# Patient Record
Sex: MALE | Race: BLACK OR AFRICAN AMERICAN | URBAN - METROPOLITAN AREA
[De-identification: names, ages, dates, MRNs, and addresses within clinical notes are randomized per-mention and may not be internally consistent; named-entity substitution may affect disease eponyms.]

---

## 2018-08-03 ENCOUNTER — OFFICE VISIT (OUTPATIENT)
Dept: FAMILY MEDICINE CLINIC | Age: 28
End: 2018-08-03

## 2018-08-03 VITALS
WEIGHT: 201.8 LBS | SYSTOLIC BLOOD PRESSURE: 118 MMHG | HEIGHT: 71 IN | DIASTOLIC BLOOD PRESSURE: 65 MMHG | BODY MASS INDEX: 28.25 KG/M2 | TEMPERATURE: 97.6 F | OXYGEN SATURATION: 99 % | RESPIRATION RATE: 16 BRPM | HEART RATE: 53 BPM

## 2018-08-03 DIAGNOSIS — Z86.39 HISTORY OF HYPERTHYROIDISM: ICD-10-CM

## 2018-08-03 DIAGNOSIS — Z00.00 WELL ADULT EXAM: Primary | ICD-10-CM

## 2018-08-03 LAB
BILIRUB UR QL STRIP: NEGATIVE
GLUCOSE UR-MCNC: NEGATIVE MG/DL
KETONES P FAST UR STRIP-MCNC: NEGATIVE MG/DL
PH UR STRIP: 7 [PH] (ref 4.6–8)
PROT UR QL STRIP: NEGATIVE
SP GR UR STRIP: 1.02 (ref 1–1.03)
UA UROBILINOGEN AMB POC: NORMAL (ref 0.2–1)
URINALYSIS CLARITY POC: CLEAR
URINALYSIS COLOR POC: YELLOW
URINE BLOOD POC: NEGATIVE
URINE LEUKOCYTES POC: NEGATIVE
URINE NITRITES POC: NEGATIVE

## 2018-08-03 NOTE — PROGRESS NOTES
Chief Complaint Patient presents with Verlinda Smoker Establish Care New patient Physical  
Room 4

## 2018-08-03 NOTE — MR AVS SNAPSHOT
[facility] [address] [facility] 
[phone] Patient: Tamika Camacho MRN: [id number] :1990 Visit Information Date & Time Provider Department Dept. Phone Encounter #  
 8/3/2018 12:00 PM Mary Barillas MD [facility] at [address] [id number] Follow-up Instructions Return if symptoms worsen or fail to improve. Your Appointments 8/3/2018 12:00 PM  
New Patient with Mary Barillas MD  
[facility] at [facility]) [address] [address]  
[facility] Upcoming Health Maintenance Date Due DTaP/Tdap/Td series (1 - Tdap) 2012 Influenza Age 5 to Adult 2019* *Topic was postponed. The date shown is not the original due date. Allergies as of 8/3/2018  Review Complete On: 8/3/2018 By: Mary Barillas MD  
 No Known Allergies Current Immunizations  Never Reviewed No immunizations on file. Not reviewed this visit You Were Diagnosed With   
  
 Codes Comments Well adult exam    -  Primary ICD-10-CM: Z00.00 ICD-9-CM: V70.0 History of hyperthyroidism     ICD-10-CM: Z86.39 
ICD-9-CM: V12.29 Vitals BP Pulse Temp Resp Height(growth percentile) Weight(growth percentile)  
 118/65 (BP 1 Location: Right arm, BP Patient Position: Sitting) (!) 53 97.6 °F (36.4 °C) (Oral) 16 5' 10.75\" (1.797 m) 201 lb 12.8 oz (91.5 kg) SpO2 BMI Smoking Status 99% 28.34 kg/m2 Never Smoker Vitals History BMI and BSA Data Body Mass Index Body Surface Area  
 28.34 kg/m 2 2.14 m 2 Your Updated Medication List  
  
Notice  As of 8/3/2018 11:49 AM  
 You have not been prescribed any medications. We Performed the Following AMB POC URINALYSIS DIP STICK AUTO W/O MICRO [18854 CPT(R)] CBC W/O DIFF [68851 CPT(R)] HEMOGLOBIN A1C WITH EAG [64955 CPT(R)] LIPID PANEL [11108 CPT(R)] METABOLIC PANEL, COMPREHENSIVE [94016 CPT(R)] T4, FREE [id number] CPT(R)] TSH 3RD GENERATION [24721 CPT(R)] Follow-up Instructions Return if symptoms worsen or fail to improve. Introducing [facility] & HEALTH SERVICES! [employer] introduces [employer] patient portal. Now you can access parts of your medical record, email your doctor's office, and request medication refills online. 1. In your internet browser, go to [email or web address] 2. Click on the First Time User? Click Here link in the Sign In box. You will see the New Member Sign Up page. 3. Enter your [employer] Access Code exactly as it appears below. You will not need to use this code after youve completed the sign-up process. If you do not sign up before the expiration date, you must request a new code. · [employer] Access Code: [email or web address] Expires: 11/1/2018 11:22 AM 
 
4. Enter the last four digits of your Social Security Number (xxxx) and Date of Birth (mm/dd/yyyy) as indicated and click Submit. You will be taken to the next sign-up page. 5. Create a Dejamor ID. This will be your Dejamor login ID and cannot be changed, so think of one that is secure and easy to remember. 6. Create a De[employer]or password. You can change your password at any time. 7. Enter your Password Reset Question and Answer. This can be used at a later time if you forget your password. 8. Enter your e-mail address. You will receive e-mail notification when new information is available in [address] 9. Click Sign Up. You can now view and download portions of your medical record. 10. Click the Download Summary menu link to download a portable copy of your medical information. If you have questions, please visit the Frequently Asked Questions section of the [employer] website.  Remember, De[employer]or is NOT to be used for urgent needs. For medical emergencies, dial 911. Now available from your iPhone and Android! Please provide this summary of care documentation to your next provider. Your primary care clinician is listed as Laurie Constantino. If you have any questions after today's visit, please call [phone].

## 2018-08-03 NOTE — PROGRESS NOTES
Subjective: Chief Complaint Patient presents with [address] [place] [address] New patient He  is a 33 y.o. male who presents for evaluation of: CPE Pt here for concerns with fatigue and much more prominent diaphoresis while playing basketball recently. Has hx of Hyperthyroidism and was treated with MMZ for about 3 yrs at [address] [facility]. Weaned off and was euthyroid. No recent labs checked for the past few years and would like labs checked today. [place] like he could not keep up at all which is much different than his baseline. Has been exercising about weekly with basketball or treadmill prior to this. Denies tachycardia, heat intolerance, irritability, jitteriness, restlessness, poor sleeping, poor eating, weight loss, palpitations. May have fine tremor. 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 Fhx of hypothyroidism in mom ROS: 
Constitutional: negative except for fevers, chills Eyes: negative for visual disturbance Ears, nose, mouth, throat, and face: negative for hearing loss and sore throat Respiratory: negative for cough or dyspnea on exertion Cardiovascular: negative for chest pain, dyspnea, palpitations, fatigue Gastrointestinal: negative for nausea, vomiting, change in bowel habits, diarrhea and abdominal pain Genitourinary:negative for frequency and dysuria Integument/breast: negative for rash and skin lesion(s) Hematologic/lymphatic: negative for easy bruising and bleeding Musculoskeletal:negative for myalgias and muscle weakness Neurological: negative for headaches and dizziness Behavioral/Psych: negative for anxiety and depression Past Medical History:  
Diagnosis Date  Thyroid disease Past Surgical History:  
Procedure Laterality Date  HX ORTHOPAEDIC Fractured Jaw No current outpatient prescriptions on file prior to visit.   
 
No current facility-administered medications on file prior to visit. Objective:  
 
Vitals:  
 08/03/18 1117 BP: 118/65 Pulse: (!) 53 Resp: 16 Temp: 97.6 °F (36.4 °C) TempSrc: Oral  
SpO2: 99% Weight: 201 lb 12.8 oz (91.5 kg) Height: 5' 10.75\" (1.797 m) Physical Examination: 
General appearance - alert, well appearing, and in no distress Eyes - sclera anicteric Ears - nml TMs bilat Nose - normal and patent, no erythema, discharge or polyps Mouth - mucous membranes moist, pharynx normal without lesions Neck - supple, no significant adenopathy Lymphatics - no palpable lymphadenopathy, no hepatosplenomegaly Chest - clear to auscultation, no wheezes, rales or rhonchi, symmetric air entry Heart - normal rate, regular rhythm, normal S1, S2, no murmurs, rubs, clicks or gallops Abdomen - soft, nontender, nondistended, no masses or organomegaly  - not indicated Neurological - alert, oriented, normal speech, no focal findings or movement disorder noted Musculoskeletal - no joint tenderness, deformity or swelling Extremities - no edema Skin - normal coloration and turgor, no rashes, no suspicious skin lesions noted Psych - nml mood and affect Assessment/ Plan:  
Diagnoses and all orders for this visit: 
 
1. Well adult exam - no major concerns, hx of hyperthyroidism as below 
-     CBC W/O DIFF 
-     METABOLIC PANEL, COMPREHENSIVE 
-     LIPID PANEL 
-     HEMOGLOBIN A1C WITH EAG 
-     TSH 3RD GENERATION 
-     AMB POC URINALYSIS DIP STICK AUTO W/O MICRO 
-     T4, FREE 2. History of hyperthyroidism - rechecking labs, suspect fatigue with exertion was more likely related to coming off night shift 
-     TSH 3RD GENERATION 
-     AMB POC URINALYSIS DIP STICK AUTO W/O MICRO 
-     T4, FREE I have discussed the diagnosis with the patient and the intended plan as seen in the above orders.   The patient has received an after-visit summary and questions were answered concerning future plans.  I have discussed medication side effects and warnings with the patient as well. The patient verbalizes understanding and agreement with the plan. Follow-up Disposition: 
Return in about 1 year (around 8/3/2019), or if symptoms worsen or fail to improve.

## 2018-08-11 LAB
ALBUMIN SERPL-MCNC: 4.3 G/DL (ref 3.5–5.5)
ALBUMIN/GLOB SERPL: 1.5 {RATIO} (ref 1.2–2.2)
ALP SERPL-CCNC: 66 IU/L (ref 39–117)
ALT SERPL-CCNC: 34 IU/L (ref 0–44)
AST SERPL-CCNC: 28 IU/L (ref 0–40)
BILIRUB SERPL-MCNC: 0.5 MG/DL (ref 0–1.2)
BUN SERPL-MCNC: 11 MG/DL (ref 6–20)
BUN/CREAT SERPL: 9 (ref 9–20)
CALCIUM SERPL-MCNC: 9.1 MG/DL (ref 8.7–10.2)
CHLORIDE SERPL-SCNC: 101 MMOL/L (ref 96–106)
CHOLEST SERPL-MCNC: 126 MG/DL (ref 100–199)
CO2 SERPL-SCNC: 24 MMOL/L (ref 20–29)
CREAT SERPL-MCNC: 1.18 MG/DL (ref 0.76–1.27)
ERYTHROCYTE [DISTWIDTH] IN BLOOD BY AUTOMATED COUNT: 14.5 % (ref 12.3–15.4)
EST. AVERAGE GLUCOSE BLD GHB EST-MCNC: 117 MG/DL
GLOBULIN SER CALC-MCNC: 2.9 G/DL (ref 1.5–4.5)
GLUCOSE SERPL-MCNC: 92 MG/DL (ref 65–99)
HBA1C MFR BLD: 5.7 % (ref 4.8–5.6)
HCT VFR BLD AUTO: 45.4 % (ref 37.5–51)
HDLC SERPL-MCNC: 43 MG/DL
HGB BLD-MCNC: 14.8 G/DL (ref 13–17.7)
LDLC SERPL CALC-MCNC: 72 MG/DL (ref 0–99)
MCH RBC QN AUTO: 26.8 PG (ref 26.6–33)
MCHC RBC AUTO-ENTMCNC: 32.6 G/DL (ref 31.5–35.7)
MCV RBC AUTO: 82 FL (ref 79–97)
PLATELET # BLD AUTO: 243 X10E3/UL (ref 150–379)
POTASSIUM SERPL-SCNC: 4.2 MMOL/L (ref 3.5–5.2)
PROT SERPL-MCNC: 7.2 G/DL (ref 6–8.5)
RBC # BLD AUTO: 5.53 X10E6/UL (ref 4.14–5.8)
SODIUM SERPL-SCNC: 141 MMOL/L (ref 134–144)
T4 FREE SERPL-MCNC: 1.42 NG/DL (ref 0.82–1.77)
TRIGL SERPL-MCNC: 56 MG/DL (ref 0–149)
TSH SERPL DL<=0.005 MIU/L-ACNC: 0.79 UIU/ML (ref 0.45–4.5)
VLDLC SERPL CALC-MCNC: 11 MG/DL (ref 5–40)
WBC # BLD AUTO: 4.5 X10E3/UL (ref 3.4–10.8)

## 2019-11-21 ENCOUNTER — OFFICE VISIT (OUTPATIENT)
Dept: FAMILY MEDICINE CLINIC | Age: 29
End: 2019-11-21

## 2019-11-21 VITALS
DIASTOLIC BLOOD PRESSURE: 67 MMHG | TEMPERATURE: 98.5 F | RESPIRATION RATE: 16 BRPM | WEIGHT: 230 LBS | HEART RATE: 59 BPM | SYSTOLIC BLOOD PRESSURE: 138 MMHG | BODY MASS INDEX: 32.2 KG/M2 | HEIGHT: 71 IN | OXYGEN SATURATION: 97 %

## 2019-11-21 DIAGNOSIS — B35.3 TINEA PEDIS OF RIGHT FOOT: ICD-10-CM

## 2019-11-21 DIAGNOSIS — M25.571 CHRONIC PAIN OF RIGHT ANKLE: ICD-10-CM

## 2019-11-21 DIAGNOSIS — G89.29 CHRONIC PAIN OF RIGHT ANKLE: ICD-10-CM

## 2019-11-21 DIAGNOSIS — L98.9 SKIN LESION OF FOOT: Primary | ICD-10-CM

## 2019-11-21 RX ORDER — CHLORPHENIRAMINE MALEATE 4 MG
TABLET ORAL 2 TIMES DAILY
Qty: 15 G | Refills: 0 | Status: SHIPPED | OUTPATIENT
Start: 2019-11-21

## 2019-11-21 RX ORDER — MELOXICAM 15 MG/1
15 TABLET ORAL
Qty: 30 TAB | Refills: 0 | Status: SHIPPED | OUTPATIENT
Start: 2019-11-21 | End: 2019-12-11 | Stop reason: SDUPTHER

## 2019-11-21 RX ORDER — SERTRALINE HYDROCHLORIDE 100 MG/1
100 TABLET, FILM COATED ORAL
COMMUNITY
Start: 2019-04-03

## 2019-11-21 NOTE — PROGRESS NOTES
Subjective:     Chief Complaint   Patient presents with    Foot Pain     Right        He  is a 30 y.o. male who presents for evaluation of:  Foot pain- R foot in between nails with hypopigmented and macerated areas x ~ 10 yrs. Specifically has an area betwee 3rd and 4th digits that is hard and painful marcelle when running. Tried topical antifungals many yrs ago. Mom has similar issue. Gen uses lotion on feet. Pain has been worse when running. Prev was dealing with right ankle especially with running. He was seen by Ortho back in 5/2019 and diagnosed with a sprain of the deltoid ligament and Achilles tendinitis. He was placed in an ASO and started on Aleve. He was sent to physical therapy. This has since improved. ROS  Gen - no fever/chills  Resp - no dyspnea or cough  CV - no chest pain or SHEPHERD  Rest per HPI    Past Medical History:   Diagnosis Date    Thyroid disease      Past Surgical History:   Procedure Laterality Date    HX ORTHOPAEDIC      Fractured Jaw     Current Outpatient Medications on File Prior to Visit   Medication Sig Dispense Refill    sertraline (ZOLOFT) 100 mg tablet Take 100 mg by mouth. No current facility-administered medications on file prior to visit. Objective:     Vitals:    11/21/19 1329   BP: 138/67   Pulse: (!) 59   Resp: 16   Temp: 98.5 °F (36.9 °C)   TempSrc: Oral   SpO2: 97%   Weight: 230 lb (104.3 kg)   Height: 5' 10.75\" (1.797 m)     Physical Examination:  General appearance - alert, well appearing, and in no distress  Eyes -sclera anicteric  Chest - clear to auscultation, no wheezes, rales or rhonchi, symmetric air entry  Heart - normal rate, regular rhythm, normal S1, S2, no murmurs, rubs, clicks or gallops  Skin -     Assessment/ Plan:   Diagnoses and all orders for this visit:    1.  Skin lesion of foot - performed bx with scrapings and shaved down top layer for comfort, most c/w callous related to tinea with chronic maceration and pressure related to running. Will tx as below. May need to see podiatry if not improving.  -     meloxicam (MOBIC) 15 mg tablet; Take 1 Tab by mouth daily (after breakfast). Take x 1 week and then stop. May use daily after this as needed. -     SURGICAL PATHOLOGY    2. Tinea pedis of right foot - still suspect Tinea based on exam.  Hx of long duration seems less convincing but will try Clotrim  -     clotrimazole (LOTRIMIN) 1 % topical cream; Apply  to affected area two (2) times a day. 3. Chronic pain of right ankle - doing well with this  -     meloxicam (MOBIC) 15 mg tablet; Take 1 Tab by mouth daily (after breakfast). Take x 1 week and then stop. May use daily after this as needed. I spent > 50% of the 25 min visit counseling and educating about: skin lesion and tinea     I have discussed the diagnosis with the patient and the intended plan as seen in the above orders. The patient has received an after-visit summary and questions were answered concerning future plans. I have discussed medication side effects and warnings with the patient as well. The patient verbalizes understanding and agreement with the plan. Follow-up and Dispositions    · Return if symptoms worsen or fail to improve.

## 2019-11-27 LAB
DX ICD CODE: NORMAL
DX ICD CODE: NORMAL
PATH REPORT.FINAL DX SPEC: NORMAL
PATH REPORT.GROSS SPEC: NORMAL
PATH REPORT.RELEVANT HX SPEC: NORMAL
PATH REPORT.SITE OF ORIGIN SPEC: NORMAL
PATHOLOGIST NAME: NORMAL
PAYMENT PROCEDURE: NORMAL

## 2019-12-11 DIAGNOSIS — G89.29 CHRONIC PAIN OF RIGHT ANKLE: ICD-10-CM

## 2019-12-11 DIAGNOSIS — M25.571 CHRONIC PAIN OF RIGHT ANKLE: ICD-10-CM

## 2019-12-11 DIAGNOSIS — L98.9 SKIN LESION OF FOOT: ICD-10-CM

## 2019-12-11 RX ORDER — MELOXICAM 15 MG/1
TABLET ORAL
Qty: 30 TAB | Refills: 0 | Status: SHIPPED | OUTPATIENT
Start: 2019-12-11

## 2020-02-28 DIAGNOSIS — J02.9 SORE THROAT: ICD-10-CM

## 2020-02-28 DIAGNOSIS — R05.9 COUGH: Primary | ICD-10-CM

## 2020-02-28 RX ORDER — GUAIFENESIN 600 MG/1
600 TABLET, EXTENDED RELEASE ORAL
Qty: 40 TAB | Refills: 1 | Status: SHIPPED | OUTPATIENT
Start: 2020-02-28

## 2021-02-10 PROBLEM — Z86.39 HISTORY OF HYPERTHYROIDISM: Status: ACTIVE | Noted: 2021-02-10

## 2021-02-15 ENCOUNTER — VIRTUAL VISIT (OUTPATIENT)
Dept: FAMILY MEDICINE CLINIC | Age: 31
End: 2021-02-15

## 2021-02-15 DIAGNOSIS — R73.03 PREDIABETES: ICD-10-CM

## 2021-02-15 DIAGNOSIS — Z13.220 SCREENING CHOLESTEROL LEVEL: ICD-10-CM

## 2021-02-15 DIAGNOSIS — E05.90 HYPERTHYROIDISM: Primary | ICD-10-CM

## 2021-02-15 PROCEDURE — 99213 OFFICE O/P EST LOW 20 MIN: CPT | Performed by: STUDENT IN AN ORGANIZED HEALTH CARE EDUCATION/TRAINING PROGRAM

## 2021-02-15 NOTE — PROGRESS NOTES
Kristina Jay  35 y.o. male  1990  [address]  [address]  [id number]   [facility]:    Telemedicine Progress Note  Andrew Duron MD       Encounter Date and Time: February 15, 2021 at 3:51 PM    Consent:  He and/or the health care decision maker is aware that that he may receive a bill for this [facility] service, depending on his insurance coverage, and has provided verbal consent to proceed: Yes    Chief Complaint   Patient presents with    Hyperthyroidism     History of Present Illness   Kristina Jay is a 35 y.o. male was evaluated by synchronous (real-time) audio-video technology from home, through the [employer]  Patient Portal.    Pt would like to be evaluated for the following;    Hyperthyroidism:    Has hx of Hyperthyroidism diagnosed more than 5 years ago. He was treated with Methimazole for about 3 yrs at [address] [place] [address]. Weaned off and was euthyroid. No recent labs checked for the past few years and would like labs checked today. [place] like he has been having some heat intolerance at work lately which he thinks could be related to hyperthyroidism. Works at [employer]. Denies tachycardia, weight loss,  irritability, jitteriness, restlessness, poor sleeping, poor eating, weight loss, palpitations. May have fine tremor. Review of Systems   ROS    Review of systems:  Items bolded if positive.   Constitutional: Fever, chills, night sweats  Endocrine: Weight change, heat/ cold intolerance, tremor, insomnia, polyuria, polydipsia, polyphagia, abnl hair growth, nail changes  Cardiovascular: Chest pain, palpitations, syncope, lower extremity edema, orthopnea, paroxysmal nocturnal dyspnea  Pulmonary: Shortness of breath, dyspnea on exertion, cough, hemoptysis, wheezing    Vitals/Objective:     General: alert, cooperative, no distress   Mental  status: mental status: alert, oriented to person, place, and time, normal mood, behavior, speech, dress, motor activity, and thought processes   Resp: resp: normal effort and no respiratory distress   Neuro: neuro: no gross deficits   Skin: skin: no discoloration or lesions of concern on visible areas   Due to this being a TeleHealth evaluation, many elements of the physical examination are unable to be assessed. Assessment and Plan:         ICD-10-CM ICD-9-CM    1. Hyperthyroidism  E05.90 242.90 T4, FREE      TSH 3RD GENERATION   2. Screening cholesterol level  Z13.220 V77.91 LIPID PANEL   3. Prediabetes  R73.03 790.29 HEMOGLOBIN A1C WITH EAG     1. Hyperthyroidism  Will recheck labs and if positive, will send patient back to see Endo for further evaluation.  - T4, FREE  - TSH 3RD GENERATION    2. Screening cholesterol level  - LIPID PANEL    3. Prediabetes  - HEMOGLOBIN A1C WITH EAG  - counseled on lifestyle; diet, exercise. We discussed the expected course, resolution and complications of the diagnosis(es) in detail. Medication risks, benefits, costs, interactions, and alternatives were discussed as indicated. I advised him to contact the office if his condition worsens, changes or fails to improve as anticipated. He expressed understanding with the diagnosis(es) and plan. Patient understands that this encounter was a temporary measure, and the importance of further follow up and examination was emphasized. Patient verbalized understanding. Patient informed to follow up: 1 year or if symptoms worsen of fail to improve. Electronically Signed: Pao Rudolph MD    Providers location when delivering service (clinic, hospital, home): clinic    CPT Codes 29058-32315 for Established Patients may apply to this Telehealth Visit. POS code: 18.   Modifier GT      Pursuant to the emergency declaration under the [address] [place] [address] and the [employer] and [employer] Act, this Virtual Visit was conducted, with patient's consent, to reduce the patient's risk of exposure to COVID-19 and provide continuity of care for an established patient. Services were provided through a video synchronous discussion virtually to substitute for in-person clinic visit. History   Patients past medical, surgical and family histories were reviewed and updated.       Past Medical History:   Diagnosis Date    Thyroid disease      Past Surgical History:   Procedure Laterality Date    HX ORTHOPAEDIC      Fractured Jaw     Family History   Problem Relation Age of Onset    No Known Problems Mother     No Known Problems Father     No Known Problems Sister     No Known Problems Brother     No Known Problems Sister     No Known Problems Brother     No Known Problems Brother      Social History     Socioeconomic History    Marital status: SINGLE     Spouse name: Not on file    Number of children: Not on file    Years of education: Not on file    Highest education level: Not on file   Occupational History    Not on file   Social Needs    Financial resource strain: Not on file    Food insecurity     Worry: Not on file     Inability: Not on file    Transportation needs     Medical: Not on file     Non-medical: Not on file   Tobacco Use    Smoking status: Never Smoker    Smokeless tobacco: Never Used   Substance and Sexual Activity    Alcohol use: No    Drug use: No    Sexual activity: Not Currently   Lifestyle    Physical activity     Days per week: Not on file     Minutes per session: Not on file    Stress: Not on file   Relationships    Social connections     Talks on phone: Not on file     Gets together: Not on file     Attends Mandaeism service: Not on file     Active member of club or organization: Not on file     Attends meetings of clubs or organizations: Not on file     Relationship status: Not on file    Intimate partner violence     Fear of current or ex partner: Not on file     Emotionally abused: Not on file     Physically abused: Not on file     Forced sexual activity: Not on file   Other Topics Concern    Not on file   Social History Narrative    Not on file     Patient Active Problem List   Diagnosis Code    History of hyperthyroidism Z86.39          Current Medications/Allergies   Medications and Allergies reviewed:    Current Outpatient Medications   Medication Sig Dispense Refill    benzocaine-menthol (CEPACOL SORE THROAT, LOLA-MEN,) 15-3.6 mg lozg lozenge 1 Lozenge by Mucous Membrane route Q2.5H PRN (Sore Throat). 18 Lozenge 1    guaiFENesin ER (MUCINEX) 600 mg ER tablet Take 1 Tab by mouth two (2) times daily as needed for Congestion. 40 Tab 1    meloxicam (MOBIC) 15 mg tablet TAKE 1 TAB BY MOUTH DAILY (AFTER BREAKFAST) FOR 1 WEEK THEN STOP. MAY USE DAILY AFTER THIS AS NEEDED 30 Tab 0    sertraline (ZOLOFT) 100 mg tablet Take 100 mg by mouth.  clotrimazole (LOTRIMIN) 1 % topical cream Apply  to affected area two (2) times a day.  15 g 0     No Known Allergies

## 2021-02-15 NOTE — LETTER
2/15/2021 4:04 PM 
 
Mr. Bindu Sevillais 
[address], [address] [facility] [address] Sincerely, Cristy Read MD

## 2021-02-16 LAB
CHOLEST SERPL-MCNC: 162 MG/DL (ref 100–199)
EST. AVERAGE GLUCOSE BLD GHB EST-MCNC: 131 MG/DL
HBA1C MFR BLD: 6.2 % (ref 4.8–5.6)
HDLC SERPL-MCNC: 28 MG/DL
LDLC SERPL CALC-MCNC: 61 MG/DL (ref 0–99)
T4 FREE SERPL-MCNC: 0.99 NG/DL (ref 0.82–1.77)
TRIGL SERPL-MCNC: 480 MG/DL (ref 0–149)
TSH SERPL DL<=0.005 MIU/L-ACNC: 1.04 UIU/ML (ref 0.45–4.5)
VLDLC SERPL CALC-MCNC: 73 MG/DL (ref 5–40)

## 2021-02-18 DIAGNOSIS — R73.03 PREDIABETES: Primary | ICD-10-CM

## 2021-02-18 RX ORDER — METFORMIN HYDROCHLORIDE 500 MG/1
500 TABLET, EXTENDED RELEASE ORAL
Qty: 90 TAB | Refills: 3 | Status: SHIPPED | OUTPATIENT
Start: 2021-02-18

## 2021-02-18 RX ORDER — METFORMIN HYDROCHLORIDE 500 MG/1
500 TABLET, EXTENDED RELEASE ORAL
Qty: 90 TAB | Refills: 3 | Status: SHIPPED | OUTPATIENT
Start: 2021-02-18 | End: 2021-02-18 | Stop reason: SDUPTHER

## 2022-03-20 PROBLEM — Z86.39 HISTORY OF HYPERTHYROIDISM: Status: ACTIVE | Noted: 2021-02-10

## 2023-05-24 RX ORDER — CLOTRIMAZOLE 1 %
CREAM (GRAM) TOPICAL 2 TIMES DAILY
COMMUNITY
Start: 2019-11-21

## 2023-05-24 RX ORDER — GUAIFENESIN 600 MG/1
600 TABLET, EXTENDED RELEASE ORAL 2 TIMES DAILY PRN
COMMUNITY
Start: 2020-02-28

## 2023-05-24 RX ORDER — SERTRALINE HYDROCHLORIDE 100 MG/1
100 TABLET, FILM COATED ORAL
COMMUNITY
Start: 2019-04-03

## 2023-05-24 RX ORDER — MELOXICAM 15 MG/1
TABLET ORAL
COMMUNITY
Start: 2019-12-11

## 2023-05-24 RX ORDER — METFORMIN HYDROCHLORIDE 500 MG/1
500 TABLET, EXTENDED RELEASE ORAL
COMMUNITY
Start: 2021-02-18